# Patient Record
Sex: FEMALE | URBAN - METROPOLITAN AREA
[De-identification: names, ages, dates, MRNs, and addresses within clinical notes are randomized per-mention and may not be internally consistent; named-entity substitution may affect disease eponyms.]

---

## 2019-05-13 ENCOUNTER — APPOINTMENT (RX ONLY)
Dept: URBAN - METROPOLITAN AREA CLINIC 23 | Facility: CLINIC | Age: 44
Setting detail: DERMATOLOGY
End: 2019-05-13

## 2019-05-13 DIAGNOSIS — L30.5 PITYRIASIS ALBA: ICD-10-CM

## 2019-05-13 DIAGNOSIS — Z41.9 ENCOUNTER FOR PROCEDURE FOR PURPOSES OTHER THAN REMEDYING HEALTH STATE, UNSPECIFIED: ICD-10-CM

## 2019-05-13 DIAGNOSIS — D22 MELANOCYTIC NEVI: ICD-10-CM

## 2019-05-13 DIAGNOSIS — L20.89 OTHER ATOPIC DERMATITIS: ICD-10-CM

## 2019-05-13 PROBLEM — E03.9 HYPOTHYROIDISM, UNSPECIFIED: Status: ACTIVE | Noted: 2019-05-13

## 2019-05-13 PROBLEM — D23.71 OTHER BENIGN NEOPLASM OF SKIN OF RIGHT LOWER LIMB, INCLUDING HIP: Status: ACTIVE | Noted: 2019-05-13

## 2019-05-13 PROBLEM — D22.72 MELANOCYTIC NEVI OF LEFT LOWER LIMB, INCLUDING HIP: Status: ACTIVE | Noted: 2019-05-13

## 2019-05-13 PROCEDURE — ? FILLERS

## 2019-05-13 PROCEDURE — ? COUNSELING

## 2019-05-13 PROCEDURE — ? ADDITIONAL NOTES

## 2019-05-13 PROCEDURE — 99203 OFFICE O/P NEW LOW 30 MIN: CPT

## 2019-05-13 PROCEDURE — ? BOTOX

## 2019-05-13 ASSESSMENT — LOCATION ZONE DERM
LOCATION ZONE: TRUNK
LOCATION ZONE: LEG

## 2019-05-13 ASSESSMENT — LOCATION SIMPLE DESCRIPTION DERM
LOCATION SIMPLE: LEFT BUTTOCK
LOCATION SIMPLE: LEFT CALF
LOCATION SIMPLE: LEFT LOWER BACK

## 2019-05-13 ASSESSMENT — LOCATION DETAILED DESCRIPTION DERM
LOCATION DETAILED: LEFT DISTAL LATERAL CALF
LOCATION DETAILED: LEFT INFERIOR MEDIAL LOWER BACK
LOCATION DETAILED: LEFT BUTTOCK

## 2019-05-13 NOTE — HPI: SKIN LESIONS
Is This A New Presentation, Or A Follow-Up?: Skin Lesions
How Severe Is Your Skin Lesion?: moderate
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 Has had these lesions for years, they are asymptomatic, and come and go.

## 2019-05-13 NOTE — PROCEDURE: FILLERS
Additional Area 2 Location: Lips, oral commissure, glabellar fine fines
Cheeks Filler Volume In Cc: 0
Additional Area 2 Location: Nasalabial, Glabellar fine lines- Complimentary
Detail Level: Zone
Additional Area 4 Location: Lateral cheeks, lateral Jawline, lateral mouth
Additional Area 5 Location: Cheeks, vermillion lips, marionette fine lines, glabellar fine lines, lateral mouth
Additional Area 4 Volume In Cc: 1
Additional Area 3 Location: Glabbellar fine lines, Nasalabial folds, Marionette lines, vermillion lip
Use Map Statement For Sites (Optional): No
Price (Use Numbers Only, No Special Characters Or $): 0.00
Lot #: [id number]
Map Statment: See [address] for Complete Details
Additional Area 4 Location: Perioral
Expiration Date (Month Year): 03/01/2020
Lot #: [id number]
Lot #: [id number]
Anesthesia Type: 1% lidocaine without epinephrine
Expiration Date (Month Year): 08/31/2019
Include Cannula Size?: 25G
Expiration Date (Month Year): 09/2021
Additional Area 1 Location: lateral mouth, perioral fine lines, vermillion lips, marionette lines
Consent: Written consent obtained. Risks include but not limited to bruising, beading, irregular texture, ulceration, infection, allergic reaction, scar formation, incomplete augmentation, temporary nature, procedural pain.
Include Cannula Brand?: DermaSculpt
Additional Anesthesia Volume In Cc: 6
Include Cannula Length?: 1.5 inch
Additional Area 1 Location: cheeks
Post-Care Instructions: Patient instructed to apply ice to reduce swelling.
Additional Area 2 Location: cheeks, jawline, oral commissure
Additional Area 1 Location: lateral mouth, fine lines perioral, marionette lines, lateral cheeks, vermillion lips
Filler: Juvederm Ultra XC

## 2019-05-13 NOTE — PROCEDURE: BOTOX
Inferior Lateral Orbicularis Oculi Units: 0
Additional Area 3 Location: high forehead 2 cm above brows
Dilution (U/0.1 Cc): 2.5
Consent: Written consent obtained. Risks include but not limited to lid/brow ptosis, bruising, swelling, diplopia, temporary effect, incomplete chemical denervation.
Periorbital Skin Units: 24
Expiration Date (Month Year): 10/2021
Additional Area 5 Location: high forehead 1.5cm above brows
Detail Level: Zone
Additional Area 1 Location: bunny lines
Additional Area 6 Location: [facility].
Lot #: [id number]
Price (Use Numbers Only, No Special Characters Or $): 0.00
Additional Area 2 Location: chin

## 2019-05-21 ENCOUNTER — APPOINTMENT (RX ONLY)
Dept: URBAN - METROPOLITAN AREA CLINIC 23 | Facility: CLINIC | Age: 44
Setting detail: DERMATOLOGY
End: 2019-05-21

## 2019-05-21 DIAGNOSIS — Z41.9 ENCOUNTER FOR PROCEDURE FOR PURPOSES OTHER THAN REMEDYING HEALTH STATE, UNSPECIFIED: ICD-10-CM

## 2019-05-21 PROCEDURE — ? DIAGNOSIS COMMENT

## 2019-05-21 PROCEDURE — ? BOTOX

## 2019-05-21 NOTE — PROCEDURE: BOTOX
Lateral Platysmal Bands Units: 0
Dilution (U/0.1 Cc): 2.5
Additional Area 5 Location: high forehead 1.5cm above brows
Additional Area 3 Location: high forehead 2 cm above brows
Glabellar Complex Units: 6
Price (Use Numbers Only, No Special Characters Or $): 0.00
Additional Area 2 Location: chin
Additional Area 1 Location: bunny lines
Lot #: [id number]
Additional Area 6 Location: [facility].
Detail Level: Zone
Consent: Written consent obtained. Risks include but not limited to lid/brow ptosis, bruising, swelling, diplopia, temporary effect, incomplete chemical denervation.
Expiration Date (Month Year): 10/2021